# Patient Record
Sex: FEMALE | Race: WHITE
[De-identification: names, ages, dates, MRNs, and addresses within clinical notes are randomized per-mention and may not be internally consistent; named-entity substitution may affect disease eponyms.]

---

## 2020-08-20 PROBLEM — F32.5 MAJOR DEPRESSIVE DISORDER IN REMISSION: Status: ACTIVE | Noted: 2020-08-20

## 2024-11-01 ENCOUNTER — HOSPITAL ENCOUNTER (OUTPATIENT)
Dept: HOSPITAL 100 - LAB | Age: 29
Discharge: HOME | End: 2024-11-01
Payer: COMMERCIAL

## 2024-11-01 DIAGNOSIS — Z79.899: Primary | ICD-10-CM

## 2024-11-01 LAB
ALANINE AMINOTRANSFER ALT/SGPT: 43 U/L (ref 13–56)
ALBUMIN SERPL-MCNC: 4 G/DL (ref 3.2–5)
ALKALINE PHOSPHATASE: 57 U/L (ref 45–117)
AMPHET UR-MCNC: NEGATIVE NG/ML
AST(SGOT): 24 U/L (ref 15–37)
BARBITURATE URINE VISTA: NEGATIVE
BENZODIAZEPINE URINE VISTA: NEGATIVE
BILIRUB DIRECT SERPL-MCNC: 0.11 MG/DL (ref 0–0.3)
COCAINE URINE VISTA: NEGATIVE
DRUG CONFIRMATION TO FOLLOW?: (no result)
ECSTACY URINE VISTA: NEGATIVE
GLOBULIN: 3.2 G/DL (ref 2.2–4.2)
METHADONE URINE VISTA: NEGATIVE
PCP UR QL: NEGATIVE
PH UR: 5 [PH]
THC URINE VISTA: POSITIVE

## 2024-11-01 PROCEDURE — 36415 COLL VENOUS BLD VENIPUNCTURE: CPT

## 2024-11-01 PROCEDURE — 80076 HEPATIC FUNCTION PANEL: CPT

## 2024-11-01 PROCEDURE — 80307 DRUG TEST PRSMV CHEM ANLYZR: CPT

## 2024-11-06 PROBLEM — R42 VERTIGO: Status: ACTIVE | Noted: 2024-11-06

## 2024-11-06 PROBLEM — J02.9 SORE THROAT: Status: ACTIVE | Noted: 2024-11-06

## 2024-11-06 PROBLEM — R59.0 CERVICAL ADENOPATHY: Status: ACTIVE | Noted: 2024-11-06

## 2024-11-27 ENCOUNTER — OFFICE VISIT (OUTPATIENT)
Dept: CARDIOLOGY | Facility: CLINIC | Age: 29
End: 2024-11-27
Payer: MEDICARE

## 2024-11-27 VITALS
HEIGHT: 65 IN | DIASTOLIC BLOOD PRESSURE: 79 MMHG | OXYGEN SATURATION: 98 % | HEART RATE: 90 BPM | SYSTOLIC BLOOD PRESSURE: 138 MMHG | WEIGHT: 278 LBS | BODY MASS INDEX: 46.32 KG/M2

## 2024-11-27 DIAGNOSIS — R00.2 PALPITATIONS: Primary | ICD-10-CM

## 2024-11-27 DIAGNOSIS — R00.0 FAST HEART BEAT: ICD-10-CM

## 2024-11-27 PROCEDURE — 99213 OFFICE O/P EST LOW 20 MIN: CPT | Performed by: PHYSICIAN ASSISTANT

## 2024-11-27 PROCEDURE — 1036F TOBACCO NON-USER: CPT | Performed by: PHYSICIAN ASSISTANT

## 2024-11-27 PROCEDURE — 99203 OFFICE O/P NEW LOW 30 MIN: CPT | Performed by: PHYSICIAN ASSISTANT

## 2024-11-27 PROCEDURE — 3008F BODY MASS INDEX DOCD: CPT | Performed by: PHYSICIAN ASSISTANT

## 2024-11-27 PROCEDURE — 93005 ELECTROCARDIOGRAM TRACING: CPT | Performed by: PHYSICIAN ASSISTANT

## 2024-11-27 RX ORDER — GABAPENTIN 300 MG/1
300 CAPSULE ORAL 2 TIMES DAILY
COMMUNITY

## 2024-11-27 RX ORDER — NEFAZODONE HYDROCHLORIDE 50 MG/1
50 TABLET ORAL DAILY
COMMUNITY

## 2024-11-27 NOTE — PROGRESS NOTES
"Chief Complaint:   Establish Care, palpitations, tachycardia     History Of Present Illness:    Tiffanie Lozoya is a 29 y.o. female presenting with elevated/tachycardia pulse rates with palpitations occurring only with minimal exertion activities.  Heart rates can reach as high as 160 bpm with simply getting out of her car to walk into the office today.  No family history of advanced cardiovascular disease.  Patient denies chest pain, chest pressure, dyspnea on exertion, shortness of breath at rest, diaphoresis, nausea/vomiting, back pain, headache, lightheadedness, dizziness, syncope or presyncopal episodes, active bleeding signs or symptoms, excessive weight gain, muscle or joint pain, claudication.       Last Recorded Vitals:  Vitals:    11/27/24 1044   BP: 138/79   BP Location: Left arm   Patient Position: Sitting   BP Cuff Size: Adult   Pulse: 90   SpO2: 98%   Weight: 126 kg (278 lb)   Height: 1.651 m (5' 5\")       Past Medical History:  She has no past medical history on file.    Past Surgical History:  She has no past surgical history on file.      Social History:  She reports that she has quit smoking. Her smoking use included cigarettes. She has never used smokeless tobacco. No history on file for alcohol use and drug use.    Family History:  No family history on file.     Allergies:  Depakote [divalproex], Adhesive tape-silicones, Amoxicillin, Lamictal [lamotrigine], and Zithromax z-atul [azithromycin]    Outpatient Medications:  Current Outpatient Medications   Medication Instructions    gabapentin (NEURONTIN) 300 mg, oral, 2 times daily    nefazodone (SERZONE) 50 mg, oral, Daily       Physical Exam:  Constitutional: awake and alert, oriented ×3, no apparent distress  Skin: warm, dry, good turgor no obvious lesions  Eyes: pupils equal, round, reactive to light, conjunctiva pink and noninjected, no discharge  HENT: normocephalic and atraumatic, mucous membranes moist, trachea midline with no " "masses/goiter  Cardiovascular: S1/S2 regular, no murmur no rubs/gallops, no carotid bruits, no JVD  Pulmonary: symmetrical chest expansion, lungs are clear to auscultation bilaterally, no wheezes/rales/rhonchi, normal effort  Abdomen: nontender, nondistended, active bowel sounds, no ascites  Extremities: no cyanosis, clubbing, no LE edema no lesions; palpable pedal pulses  Neurologic: cranial nerves II - XII grossly intact, stable gait, no tremor       Last Labs:  CBC -  No results found for: \"WBC\", \"HGB\", \"HCT\", \"MCV\", \"PLT\"    CMP -  No results found for: \"CALCIUM\", \"PHOS\", \"PROT\", \"ALBUMIN\", \"AST\", \"ALT\", \"ALKPHOS\", \"BILITOT\"    LIPID PANEL -   No results found for: \"CHOL\", \"TRIG\", \"HDL\", \"CHHDL\", \"LDLF\", \"VLDL\", \"NHDL\"    RENAL FUNCTION PANEL -   No results found for: \"GLUCOSE\", \"NA\", \"K\", \"CL\", \"CO2\", \"ANIONGAP\", \"BUN\", \"CREATININE\", \"GFRMALE\", \"CALCIUM\", \"PHOS\", \"ALBUMIN\"     Lab Results   Component Value Date    HGBA1C 5.0 06/07/2024       Last Cardiology Tests:  ECG:  No results found for this or any previous visit from the past 1095 days.      Echo:  No results found for this or any previous visit from the past 1095 days.      Ejection Fractions:  No results found for: \"EF\"    Cath:  No results found for this or any previous visit from the past 1095 days.      Stress Test:  No results found for this or any previous visit from the past 1095 days.      Cardiac Imaging:  No results found for this or any previous visit from the past 1095 days.      Assessment/Plan   Problem List Items Addressed This Visit             ICD-10-CM       Cardiac and Vasculature    Fast heart beat R00.0    Relevant Orders    ECG 12 lead (Clinic Performed)    Holter or Event Cardiac Monitor    Palpitations - Primary R00.2    Relevant Orders    Holter or Event Cardiac Monitor       -No concerning findings on today's EKG    -We will place a 48 hour Holter monitor for further arrhythmic evaluation    -F/U 3 months    Rafal Squires, " EVERARDO

## 2024-11-29 LAB
ATRIAL RATE: 85 BPM
P AXIS: 30 DEGREES
P OFFSET: 204 MS
P ONSET: 155 MS
PR INTERVAL: 136 MS
Q ONSET: 223 MS
QRS COUNT: 14 BEATS
QRS DURATION: 92 MS
QT INTERVAL: 368 MS
QTC CALCULATION(BAZETT): 437 MS
QTC FREDERICIA: 413 MS
R AXIS: 83 DEGREES
T AXIS: 27 DEGREES
T OFFSET: 407 MS
VENTRICULAR RATE: 85 BPM

## 2024-12-04 ENCOUNTER — APPOINTMENT (OUTPATIENT)
Dept: CARDIOLOGY | Facility: CLINIC | Age: 29
End: 2024-12-04
Payer: MEDICARE

## 2024-12-04 ENCOUNTER — HOSPITAL ENCOUNTER (OUTPATIENT)
Dept: CARDIOLOGY | Facility: CLINIC | Age: 29
Discharge: HOME | End: 2024-12-04
Payer: MEDICARE

## 2024-12-04 DIAGNOSIS — R00.2 PALPITATIONS: ICD-10-CM

## 2024-12-04 DIAGNOSIS — R00.0 FAST HEART BEAT: ICD-10-CM

## 2024-12-04 PROCEDURE — 93225 XTRNL ECG REC<48 HRS REC: CPT

## 2024-12-11 ENCOUNTER — APPOINTMENT (OUTPATIENT)
Dept: PRIMARY CARE | Facility: CLINIC | Age: 29
End: 2024-12-11

## 2024-12-11 VITALS
DIASTOLIC BLOOD PRESSURE: 70 MMHG | HEIGHT: 65 IN | OXYGEN SATURATION: 97 % | HEART RATE: 110 BPM | BODY MASS INDEX: 46.08 KG/M2 | SYSTOLIC BLOOD PRESSURE: 120 MMHG | WEIGHT: 276.6 LBS

## 2024-12-11 DIAGNOSIS — Z00.00 ANNUAL PHYSICAL EXAM: Primary | ICD-10-CM

## 2024-12-11 DIAGNOSIS — R76.8 ANA POSITIVE: ICD-10-CM

## 2024-12-11 DIAGNOSIS — F33.2 SEVERE RECURRENT MAJOR DEPRESSION WITHOUT PSYCHOTIC FEATURES (MULTI): ICD-10-CM

## 2024-12-11 PROBLEM — R42 VERTIGO: Status: RESOLVED | Noted: 2024-11-06 | Resolved: 2024-12-11

## 2024-12-11 PROBLEM — H47.329 OPTIC NERVE DRUSEN: Status: ACTIVE | Noted: 2024-12-11

## 2024-12-11 PROBLEM — G43.109 OCULAR MIGRAINE: Status: ACTIVE | Noted: 2024-12-11

## 2024-12-11 PROBLEM — J02.9 SORE THROAT: Status: RESOLVED | Noted: 2024-11-06 | Resolved: 2024-12-11

## 2024-12-11 PROBLEM — F41.9 ANXIETY: Status: ACTIVE | Noted: 2024-12-11

## 2024-12-11 PROBLEM — F31.81 BIPOLAR 2 DISORDER (MULTI): Status: RESOLVED | Noted: 2024-06-08 | Resolved: 2024-12-11

## 2024-12-11 PROBLEM — F31.81 BIPOLAR 2 DISORDER (MULTI): Status: ACTIVE | Noted: 2024-06-08

## 2024-12-11 PROBLEM — F32.5 MAJOR DEPRESSIVE DISORDER IN REMISSION (CMS-HCC): Status: ACTIVE | Noted: 2020-08-20

## 2024-12-11 PROBLEM — F32.A DEPRESSION: Status: ACTIVE | Noted: 2024-12-11

## 2024-12-11 PROBLEM — F90.9 ADHD (ATTENTION DEFICIT HYPERACTIVITY DISORDER): Status: ACTIVE | Noted: 2024-12-11

## 2024-12-11 PROBLEM — R59.0 CERVICAL ADENOPATHY: Status: RESOLVED | Noted: 2024-11-06 | Resolved: 2024-12-11

## 2024-12-11 PROBLEM — F40.10 SOCIAL ANXIETY DISORDER: Status: ACTIVE | Noted: 2024-12-11

## 2024-12-11 PROCEDURE — 1036F TOBACCO NON-USER: CPT | Performed by: INTERNAL MEDICINE

## 2024-12-11 PROCEDURE — 3008F BODY MASS INDEX DOCD: CPT | Performed by: INTERNAL MEDICINE

## 2024-12-11 PROCEDURE — 99385 PREV VISIT NEW AGE 18-39: CPT | Performed by: INTERNAL MEDICINE

## 2024-12-11 RX ORDER — CLOBETASOL PROPIONATE 0.5 MG/G
1 CREAM TOPICAL 2 TIMES DAILY
COMMUNITY

## 2024-12-11 RX ORDER — NEFAZODONE HYDROCHLORIDE 100 MG/1
100 TABLET ORAL 2 TIMES DAILY
COMMUNITY
Start: 2024-11-25

## 2024-12-11 RX ORDER — DESONIDE 0.5 MG/G
1 CREAM TOPICAL 2 TIMES DAILY
COMMUNITY

## 2024-12-11 RX ORDER — CLOBETASOL PROPIONATE 0.5 MG/ML
1 SOLUTION TOPICAL 2 TIMES DAILY
COMMUNITY

## 2024-12-11 ASSESSMENT — PATIENT HEALTH QUESTIONNAIRE - PHQ9
5. POOR APPETITE OR OVEREATING: NEARLY EVERY DAY
9. THOUGHTS THAT YOU WOULD BE BETTER OFF DEAD, OR OF HURTING YOURSELF: NEARLY EVERY DAY
8. MOVING OR SPEAKING SO SLOWLY THAT OTHER PEOPLE COULD HAVE NOTICED. OR THE OPPOSITE, BEING SO FIGETY OR RESTLESS THAT YOU HAVE BEEN MOVING AROUND A LOT MORE THAN USUAL: NOT AT ALL
6. FEELING BAD ABOUT YOURSELF - OR THAT YOU ARE A FAILURE OR HAVE LET YOURSELF OR YOUR FAMILY DOWN: NEARLY EVERY DAY
1. LITTLE INTEREST OR PLEASURE IN DOING THINGS: NEARLY EVERY DAY
3. TROUBLE FALLING OR STAYING ASLEEP OR SLEEPING TOO MUCH: NEARLY EVERY DAY
SUM OF ALL RESPONSES TO PHQ QUESTIONS 1-9: 24
7. TROUBLE CONCENTRATING ON THINGS, SUCH AS READING THE NEWSPAPER OR WATCHING TELEVISION: NEARLY EVERY DAY
10. IF YOU CHECKED OFF ANY PROBLEMS, HOW DIFFICULT HAVE THESE PROBLEMS MADE IT FOR YOU TO DO YOUR WORK, TAKE CARE OF THINGS AT HOME, OR GET ALONG WITH OTHER PEOPLE: EXTREMELY DIFFICULT
2. FEELING DOWN, DEPRESSED OR HOPELESS: NEARLY EVERY DAY
SUM OF ALL RESPONSES TO PHQ9 QUESTIONS 1 AND 2: 6
4. FEELING TIRED OR HAVING LITTLE ENERGY: NEARLY EVERY DAY

## 2024-12-11 ASSESSMENT — PAIN SCALES - GENERAL: PAINLEVEL_OUTOF10: 4

## 2024-12-11 ASSESSMENT — ENCOUNTER SYMPTOMS
SORE THROAT: 0
NERVOUS/ANXIOUS: 1
SLEEP DISTURBANCE: 0
PALPITATIONS: 0
ABDOMINAL PAIN: 0
COUGH: 0
DYSPHORIC MOOD: 1
VOMITING: 0
TROUBLE SWALLOWING: 0
ARTHRALGIAS: 0
FREQUENCY: 0
FATIGUE: 0
SHORTNESS OF BREATH: 0
LIGHT-HEADEDNESS: 0
CONSTIPATION: 0
DECREASED CONCENTRATION: 1
DYSURIA: 0
FEVER: 0
DIARRHEA: 0
NAUSEA: 0
DIZZINESS: 0

## 2024-12-11 NOTE — ASSESSMENT & PLAN NOTE
Annual physical completed today.  We will be checking baseline labs.  Patient has a remote history in 2019 of a possible DIANA positive test but she is never followed up on it.  She has multiple aches and pains in her review of system is very vague and is very hard to get her narrow down to say yes or no to almost any question.  We will repeat an DIANA with normal blood work to make sure that nothing else needs to be evaluated  Although she complains of multiple aches and pains that seem to move around she does not have any evidence of synovitis and denies swell swollen red joints in any way shape or form.

## 2024-12-11 NOTE — ASSESSMENT & PLAN NOTE
Regarding all the patient's psychiatric issues this is beyond the scope of my normal practice and I did explain that to her.  I will be happy to manage all of her other medical issues which currently do not seem to be severe.  She was offered a  referral to psychiatry which she declines today but she can call back and we will be happy to make that referral in the future if she would like it.  In the interim in the meantime I did encourage her to stay as an active patient with [facility] as it appears her psychiatric illness is quite severe.    I did note that throughout the visit today she did not make any eye contact with me was fidgeting with her shirt and seems somewhat anxious and uncomfortable throughout the interview process.    Although patient admits to suicidal thoughts she lives with her mother brother and her dog and has no specific plan and promises not to harm herself at this time.

## 2024-12-11 NOTE — PROGRESS NOTES
Subjective   Patient ID: Tiffanie Lozoya is a 29 y.o. female who presents for new patient visit.    Patient is here to establish with a new primary care physician.  She is currently a patient [facility] for multiple psychiatric issues including possible bipolar disorder depression anxiety.  Patient admits she has suicidal thoughts but no specific plan.  She is seeking a new counselor as well and I offered her referral to our  and a possible referral to psych at  which she declines today but will consider and call back if she changes her mind.  She is not exactly happy with alternative plan as but she also does not want to travel downtown [place] for psychiatric care.        Review of Systems   Constitutional:  Negative for fatigue and fever.   HENT:  Negative for sore throat and trouble swallowing.    Eyes:  Negative for visual disturbance.   Respiratory:  Negative for cough and shortness of breath.    Cardiovascular:  Negative for chest pain, palpitations and leg swelling.   Gastrointestinal:  Negative for abdominal pain, constipation, diarrhea, nausea and vomiting.   Genitourinary:  Negative for dysuria and frequency.   Musculoskeletal:  Negative for arthralgias.   Skin:  Negative for rash.   Neurological:  Negative for dizziness and light-headedness.   Psychiatric/Behavioral:  Positive for behavioral problems, decreased concentration, dysphoric mood and suicidal ideas. Negative for self-injury and sleep disturbance. The patient is nervous/anxious.        Objective   Medication Documentation Review Audit       Reviewed by Prachi Wells MD (Physician) on 12/11/24 at 1046      Medication Order Taking? Sig Documenting Provider Last Dose Status   clobetasol (Temovate) 0.05 % cream [id number]  Apply 1 Application topically 2 times a day. Historical Provider, MD  Active   clobetasol (Temovate) 0.05 % external solution [id number]  Apply 1 Application topically 2 times a day. Historical  "Provider, MD  Active   desonide (DesOwen) 0.05 % cream [id number]  Apply 1 Application topically 2 times a day. Historical Provider, MD  Active   gabapentin (Neurontin) 300 mg capsule [id number]  Take 1 capsule (300 mg) by mouth 2 times a day. Historical Provider, MD  Active   nefazodone (Serzone) 100 mg tablet [id number] Yes Take 1 tablet (100 mg) by mouth 2 times a day. Historical Provider, MD  Active     Discontinued 12/11/24 1036                  Allergies   Allergen Reactions    Depakote [Divalproex] Hives    Adhesive Tape-Silicones Rash    Amoxicillin Rash    Lamictal [Lamotrigine] Rash    Zithromax Z-Wayne [Azithromycin] Rash       /70   Pulse 110   Ht 1.651 m (5' 5\")   Wt 125 kg (276 lb 9.6 oz) Comment: per pt 65 in  SpO2 97%   BMI 46.03 kg/m²     Physical Exam  Constitutional:       Appearance: Normal appearance. She is obese.   HENT:      Head: Normocephalic and atraumatic.      Nose: Nose normal.   Eyes:      Extraocular Movements: Extraocular movements intact.      Pupils: Pupils are equal, round, and reactive to light.   Cardiovascular:      Rate and Rhythm: Normal rate and regular rhythm.   Pulmonary:      Breath sounds: Normal breath sounds.   Abdominal:      General: Abdomen is flat. Bowel sounds are normal.      Palpations: Abdomen is soft.   Musculoskeletal:      Right lower leg: No edema.      Left lower leg: No edema.   Neurological:      Mental Status: She is alert.           Assessment/Plan   Problem List Items Addressed This Visit       DIANA positive    Relevant Orders    Lipid Panel    CBC    Comprehensive Metabolic Panel    TSH with reflex to Free T4 if abnormal    Vitamin D 25-Hydroxy,Total (for eval of Vitamin D levels)    DIANA with Reflex to ZARINA    Severe recurrent major depression without psychotic features (Multi)     Regarding all the patient's psychiatric issues this is beyond the scope of my normal practice and I did explain that to her.  I will be happy to manage all of her " other medical issues which currently do not seem to be severe.  She was offered a  referral to psychiatry which she declines today but she can call back and we will be happy to make that referral in the future if she would like it.  In the interim in the meantime I did encourage her to stay as an active patient with [facility] as it appears her psychiatric illness is quite severe.    I did note that throughout the visit today she did not make any eye contact with me was fidgeting with her shirt and seems somewhat anxious and uncomfortable throughout the interview process.    Although patient admits to suicidal thoughts she lives with her mother brother and her dog and has no specific plan and promises not to harm herself at this time.         Relevant Orders    Lipid Panel    CBC    Comprehensive Metabolic Panel    TSH with reflex to Free T4 if abnormal    Vitamin D 25-Hydroxy,Total (for eval of Vitamin D levels)    DIANA with Reflex to ZARINA    Annual physical exam - Primary     Annual physical completed today.  We will be checking baseline labs.  Patient has a remote history in 2019 of a possible DIANA positive test but she is never followed up on it.  She has multiple aches and pains in her review of system is very vague and is very hard to get her narrow down to say yes or no to almost any question.  We will repeat an DIANA with normal blood work to make sure that nothing else needs to be evaluated  Although she complains of multiple aches and pains that seem to move around she does not have any evidence of synovitis and denies swell swollen red joints in any way shape or form.           Relevant Orders    Lipid Panel    CBC    Comprehensive Metabolic Panel    TSH with reflex to Free T4 if abnormal    Vitamin D 25-Hydroxy,Total (for eval of Vitamin D levels)    DIANA with Reflex to ZARINA              It has been a pleasure seeing you.  Prachi Wells MD

## 2024-12-17 ENCOUNTER — APPOINTMENT (OUTPATIENT)
Dept: PRIMARY CARE | Facility: CLINIC | Age: 29
End: 2024-12-17
Payer: MEDICARE

## 2024-12-18 ENCOUNTER — APPOINTMENT (OUTPATIENT)
Dept: PRIMARY CARE | Facility: CLINIC | Age: 29
End: 2024-12-18
Payer: MEDICARE

## 2024-12-18 ENCOUNTER — LAB (OUTPATIENT)
Dept: LAB | Facility: LAB | Age: 29
End: 2024-12-18
Payer: MEDICARE

## 2024-12-18 VITALS
OXYGEN SATURATION: 95 % | BODY MASS INDEX: 46.88 KG/M2 | SYSTOLIC BLOOD PRESSURE: 121 MMHG | DIASTOLIC BLOOD PRESSURE: 73 MMHG | HEART RATE: 91 BPM | HEIGHT: 65 IN | WEIGHT: 281.4 LBS

## 2024-12-18 DIAGNOSIS — R22.0 SUBCUTANEOUS MASS OF HEAD: Primary | ICD-10-CM

## 2024-12-18 DIAGNOSIS — Z00.00 ANNUAL PHYSICAL EXAM: ICD-10-CM

## 2024-12-18 DIAGNOSIS — R76.8 ANA POSITIVE: ICD-10-CM

## 2024-12-18 DIAGNOSIS — F33.2 SEVERE RECURRENT MAJOR DEPRESSION WITHOUT PSYCHOTIC FEATURES (MULTI): ICD-10-CM

## 2024-12-18 PROCEDURE — 82306 VITAMIN D 25 HYDROXY: CPT

## 2024-12-18 PROCEDURE — 80053 COMPREHEN METABOLIC PANEL: CPT

## 2024-12-18 PROCEDURE — 99213 OFFICE O/P EST LOW 20 MIN: CPT | Performed by: INTERNAL MEDICINE

## 2024-12-18 PROCEDURE — 1036F TOBACCO NON-USER: CPT | Performed by: INTERNAL MEDICINE

## 2024-12-18 PROCEDURE — 3008F BODY MASS INDEX DOCD: CPT | Performed by: INTERNAL MEDICINE

## 2024-12-18 PROCEDURE — 86038 ANTINUCLEAR ANTIBODIES: CPT

## 2024-12-18 PROCEDURE — 84443 ASSAY THYROID STIM HORMONE: CPT

## 2024-12-18 PROCEDURE — 85027 COMPLETE CBC AUTOMATED: CPT

## 2024-12-18 PROCEDURE — 36415 COLL VENOUS BLD VENIPUNCTURE: CPT

## 2024-12-18 PROCEDURE — 80061 LIPID PANEL: CPT

## 2024-12-18 ASSESSMENT — ENCOUNTER SYMPTOMS
CONSTIPATION: 0
ABDOMINAL PAIN: 0
SORE THROAT: 0
NAUSEA: 0
FREQUENCY: 0
FATIGUE: 0
FEVER: 0
ARTHRALGIAS: 0
VOMITING: 0
DIARRHEA: 0
DIZZINESS: 0
PALPITATIONS: 0
SHORTNESS OF BREATH: 0
COUGH: 0
TROUBLE SWALLOWING: 0
DYSURIA: 0
LIGHT-HEADEDNESS: 0

## 2024-12-18 ASSESSMENT — PAIN SCALES - GENERAL: PAINLEVEL_OUTOF10: 4

## 2024-12-18 NOTE — ASSESSMENT & PLAN NOTE
Patient is feeling a subcutaneous mass on the head on the left occiput near the mandibular ridge.  I do not feel the same as she is describing and only feeling 1 dimension which is why I think this is a ridge of natural tissue and not a true mass at all.  It is not firm I cannot grasp it between 2 fingers so it is not a cyst, not acne and not a lymph node.  I attempted to reassure the patient that this was a benign process and that we would monitor it for now to make sure it is not change or grow.

## 2024-12-18 NOTE — PROGRESS NOTES
"Subjective   Patient ID: Tiffanie Lozoya is a 29 y.o. female who presents for a lump in the back of the neck.    Patient is here today for a bump she found on the back of her neck and her head.  She discovered it approximately a week ago while rubbing her head she found an area that she is calling a \"lump\".  It does not bother her when she washes her hair it is not burning it is not draining but she can feel it and has noticed that it is slightly uncomfortable.  During the examination today and while talking to the patient it was quite obvious that she is touching the area over and over again and may just be sensitizing it herself because of the repeated checks she is doing            Review of Systems   Constitutional:  Negative for fatigue and fever.        Lump or bump was found on the patient's head as a coincidence, there is no erythema drainage or other symptoms.  It was a coincidental.   HENT:  Negative for sore throat and trouble swallowing.    Eyes:  Negative for visual disturbance.   Respiratory:  Negative for cough and shortness of breath.    Cardiovascular:  Negative for chest pain, palpitations and leg swelling.   Gastrointestinal:  Negative for abdominal pain, constipation, diarrhea, nausea and vomiting.   Genitourinary:  Negative for dysuria and frequency.   Musculoskeletal:  Negative for arthralgias.   Skin:  Negative for rash.   Neurological:  Negative for dizziness and light-headedness.       Objective   Medication Documentation Review Audit       Reviewed by Prachi Wells MD (Physician) on 12/11/24 at 1046      Medication Order Taking? Sig Documenting Provider Last Dose Status   clobetasol (Temovate) 0.05 % cream [id number]  Apply 1 Application topically 2 times a day. Historical Provider, MD  Active   clobetasol (Temovate) 0.05 % external solution [id number]  Apply 1 Application topically 2 times a day. Historical Provider, MD  Active   desonide (DesOwen) 0.05 % cream [id number]  Apply 1 " "Application topically 2 times a day. Historical Provider, MD  Active   gabapentin (Neurontin) 300 mg capsule [id number]  Take 1 capsule (300 mg) by mouth 2 times a day. Historical Provider, MD  Active   nefazodone (Serzone) 100 mg tablet [id number] Yes Take 1 tablet (100 mg) by mouth 2 times a day. Historical Provider, MD  Active     Discontinued 12/11/24 1036                  Allergies   Allergen Reactions    Depakote [Divalproex] Hives    Adhesive Tape-Silicones Rash    Amoxicillin Rash    Lamictal [Lamotrigine] Rash    Zithromax Z-Wayne [Azithromycin] Rash       /73   Pulse 91   Ht 1.651 m (5' 5\")   Wt 128 kg (281 lb 6.4 oz)   SpO2 95%   BMI 46.83 kg/m²     Physical Exam  Constitutional:       Appearance: Normal appearance.   HENT:      Head: Normocephalic and atraumatic.      Comments: Near the occiput versus the mastoid process on the left side.  I asked the patient to point touch the lump when I compare the left to the right side I barely feel an area that is slightly more pronounced but I would call it a ridge of tissue and not a true lump.  I could palpate the slight ridge which is less than 4 mm across in 1 direction only.  When I reverse the direction or came from 90 degree angles I could not palpate the area.  It is clearly not acne, not a cyst, not a lipoma and not a lymph node.  I believe this is a normal ridge of tissue and have tried to reassure the patient it is benign.  The patient is insistent that this is some sort of mass.  I have told her to keep an eye on it and examine it twice a day but no more than that so she does not irritate the area.  If it is still present or growing or changing she should come back and see me otherwise we will monitor the area but again I tried to reassure the patient it was benign     Nose: Nose normal.   Eyes:      Extraocular Movements: Extraocular movements intact.      Pupils: Pupils are equal, round, and reactive to light.   Cardiovascular:      Rate and " Rhythm: Normal rate and regular rhythm.   Pulmonary:      Breath sounds: Normal breath sounds.   Abdominal:      General: Abdomen is flat. Bowel sounds are normal.      Palpations: Abdomen is soft.   Musculoskeletal:      Right lower leg: No edema.      Left lower leg: No edema.   Neurological:      Mental Status: She is alert.           Assessment/Plan   Problem List Items Addressed This Visit       Subcutaneous mass of head - Primary     Patient is feeling a subcutaneous mass on the head on the left occiput near the mandibular ridge.  I do not feel the same as she is describing and only feeling 1 dimension which is why I think this is a ridge of natural tissue and not a true mass at all.  It is not firm I cannot grasp it between 2 fingers so it is not a cyst, not acne and not a lymph node.  I attempted to reassure the patient that this was a benign process and that we would monitor it for now to make sure it is not change or grow.        Patient requested an updated review of systems on January 16, 2025 to this note.  The chart correction she requested has been copy and pasted in the patient's own words.  This addendum was added by Dr. Caty zheng on January 16, 2025 at 9:52 AM  Chart correction requested:   If arthralgias are joint pain, is present/comes & goes.Elbows,wrists,hands,kn ees&ankles get sharp, achey twinges of pain. Also feel fatigue daily. Have shortness of breath,chest pain,palpitations when heart rate spikes,120-150.From anxiety and moving more than usual.Wore 48 hour heart monitor 4th-6th. 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 Did follow up on 2019 DIANA results with  [facility].Don't remember what was done but nothing found. History of self harm.Last time July but on mind a lot, not in a good menta l state. Realized I have sleep disturbances.Stay up until 12ish, almost always wake up at 3 to pee, have a hard time falling back asleep. Up around 6. Daily naps.             It has been a pleasure seeing you.  Prachi Wells MD

## 2024-12-19 ENCOUNTER — TELEPHONE (OUTPATIENT)
Dept: PRIMARY CARE | Facility: CLINIC | Age: 29
End: 2024-12-19
Payer: MEDICARE

## 2024-12-19 LAB
25(OH)D3 SERPL-MCNC: 20 NG/ML (ref 30–100)
ALBUMIN SERPL BCP-MCNC: 4.4 G/DL (ref 3.4–5)
ALP SERPL-CCNC: 52 U/L (ref 33–110)
ALT SERPL W P-5'-P-CCNC: 34 U/L (ref 7–45)
ANA SER QL HEP2 SUBST: NEGATIVE
ANION GAP SERPL CALC-SCNC: 17 MMOL/L (ref 10–20)
AST SERPL W P-5'-P-CCNC: 23 U/L (ref 9–39)
BILIRUB SERPL-MCNC: 0.5 MG/DL (ref 0–1.2)
BUN SERPL-MCNC: 11 MG/DL (ref 6–23)
CALCIUM SERPL-MCNC: 9.3 MG/DL (ref 8.6–10.6)
CHLORIDE SERPL-SCNC: 104 MMOL/L (ref 98–107)
CHOLEST SERPL-MCNC: 229 MG/DL (ref 0–199)
CHOLESTEROL/HDL RATIO: 5.6
CO2 SERPL-SCNC: 22 MMOL/L (ref 21–32)
CREAT SERPL-MCNC: 0.74 MG/DL (ref 0.5–1.05)
EGFRCR SERPLBLD CKD-EPI 2021: >90 ML/MIN/1.73M*2
ERYTHROCYTE [DISTWIDTH] IN BLOOD BY AUTOMATED COUNT: 12.6 % (ref 11.5–14.5)
GLUCOSE SERPL-MCNC: 88 MG/DL (ref 74–99)
HCT VFR BLD AUTO: 40.3 % (ref 36–46)
HDLC SERPL-MCNC: 40.8 MG/DL
HGB BLD-MCNC: 13.8 G/DL (ref 12–16)
LDLC SERPL CALC-MCNC: 149 MG/DL
MCH RBC QN AUTO: 29.2 PG (ref 26–34)
MCHC RBC AUTO-ENTMCNC: 34.2 G/DL (ref 32–36)
MCV RBC AUTO: 85 FL (ref 80–100)
NON HDL CHOLESTEROL: 188 MG/DL (ref 0–149)
NRBC BLD-RTO: 0 /100 WBCS (ref 0–0)
PLATELET # BLD AUTO: 349 X10*3/UL (ref 150–450)
POTASSIUM SERPL-SCNC: 4 MMOL/L (ref 3.5–5.3)
PROT SERPL-MCNC: 7.1 G/DL (ref 6.4–8.2)
RBC # BLD AUTO: 4.73 X10*6/UL (ref 4–5.2)
SODIUM SERPL-SCNC: 139 MMOL/L (ref 136–145)
TRIGL SERPL-MCNC: 197 MG/DL (ref 0–149)
TSH SERPL-ACNC: 1.98 MIU/L (ref 0.44–3.98)
VLDL: 39 MG/DL (ref 0–40)
WBC # BLD AUTO: 8.7 X10*3/UL (ref 4.4–11.3)

## 2024-12-19 NOTE — TELEPHONE ENCOUNTER
----- Message from Prachi Wells sent at 12/19/2024  2:45 PM EST -----  Please notify patient her DIANA or marker for autoimmune diseases was negative or normal.

## 2024-12-19 NOTE — TELEPHONE ENCOUNTER
Defib pad site: anterior/posterior  Defib pad site assessment: skin integrity intact  Patient was notified by leaving message on an identified voicemail.

## 2024-12-20 ENCOUNTER — TELEPHONE (OUTPATIENT)
Dept: PRIMARY CARE | Facility: CLINIC | Age: 29
End: 2024-12-20

## 2024-12-20 NOTE — TELEPHONE ENCOUNTER
----- Message from Prachi Wells sent at 12/19/2024  7:50 AM EST -----  Please notify patient her complete blood count, chemistry profile and thyroid levels were all normal.  Her cholesterol shows an elevation in several areas so she needs to watch her diet and work on a low-carb and low-fat diet and we will repeated in a year  Patient's vitamin D is also very low at only 20.  She should start taking vitamin D over-the-counter 5000 international units which equals iron 25 mcg every day.  Please take this every day for the next year as it is very slow to get vitamin D levels back into the normal range.  We will check it again in 1 year

## 2025-02-10 ENCOUNTER — OFFICE VISIT (OUTPATIENT)
Dept: CARDIOLOGY | Facility: CLINIC | Age: 30
End: 2025-02-10
Payer: MEDICARE

## 2025-02-10 VITALS
SYSTOLIC BLOOD PRESSURE: 122 MMHG | BODY MASS INDEX: 44.82 KG/M2 | DIASTOLIC BLOOD PRESSURE: 79 MMHG | OXYGEN SATURATION: 98 % | WEIGHT: 269 LBS | HEIGHT: 65 IN | HEART RATE: 94 BPM

## 2025-02-10 DIAGNOSIS — F33.2 SEVERE RECURRENT MAJOR DEPRESSION WITHOUT PSYCHOTIC FEATURES (MULTI): ICD-10-CM

## 2025-02-10 DIAGNOSIS — R00.0 FAST HEART BEAT: ICD-10-CM

## 2025-02-10 DIAGNOSIS — F41.9 ANXIETY: ICD-10-CM

## 2025-02-10 DIAGNOSIS — R00.2 PALPITATIONS: Primary | ICD-10-CM

## 2025-02-10 PROBLEM — F32.A DEPRESSION: Status: RESOLVED | Noted: 2024-12-11 | Resolved: 2025-02-10

## 2025-02-10 PROBLEM — E66.01 MORBID OBESITY (MULTI): Status: ACTIVE | Noted: 2025-02-10

## 2025-02-10 PROBLEM — F32.5 MAJOR DEPRESSIVE DISORDER IN REMISSION (CMS-HCC): Status: RESOLVED | Noted: 2020-08-20 | Resolved: 2025-02-10

## 2025-02-10 PROCEDURE — 99214 OFFICE O/P EST MOD 30 MIN: CPT | Performed by: PHYSICIAN ASSISTANT

## 2025-02-10 PROCEDURE — 1036F TOBACCO NON-USER: CPT | Performed by: PHYSICIAN ASSISTANT

## 2025-02-10 PROCEDURE — 3008F BODY MASS INDEX DOCD: CPT | Performed by: PHYSICIAN ASSISTANT

## 2025-02-10 RX ORDER — VIT C/E/ZN/COPPR/LUTEIN/ZEAXAN 250MG-90MG
25 CAPSULE ORAL DAILY
COMMUNITY

## 2025-02-10 NOTE — PROGRESS NOTES
"Chief Complaint:   Follow-up, palpitations, tachycardia     History Of Present Illness:    02/10/25  Patient completed a 48 hour Holter displaying sinus rhythm throughout without sustained arrhythmias.  No worsening of symptoms of palpitations, however patient was recently hospitalized due to SI.    11/27/24  Tiffanie Lozoya is a 29 y.o. female presenting with elevated/tachycardia pulse rates with palpitations occurring only with minimal exertion activities.  Heart rates can reach as high as 160 bpm with simply getting out of her car to walk into the office today.  No family history of advanced cardiovascular disease.  Patient denies chest pain, chest pressure, dyspnea on exertion, shortness of breath at rest, diaphoresis, nausea/vomiting, back pain, headache, lightheadedness, dizziness, syncope or presyncopal episodes, active bleeding signs or symptoms, excessive weight gain, muscle or joint pain, claudication.       Last Recorded Vitals:  Vitals:    02/10/25 1122   BP: 122/79   BP Location: Left arm   Patient Position: Sitting   Pulse: 94   SpO2: 98%   Weight: 122 kg (269 lb)   Height: 1.651 m (5' 5\")       Past Medical History:  She has no past medical history on file.    Past Surgical History:  She has a past surgical history that includes Tonsillectomy; Adenoidectomy; Tubal ligation; and [place] tooth extraction.      Social History:  She reports that she quit smoking about 10 years ago. Her smoking use included cigarettes. She started smoking about 6 years ago. She has a 6.1 pack-year smoking history. She has never been exposed to tobacco smoke. She has never used smokeless tobacco. She reports that she does not currently use alcohol. She reports current drug use. Drug: Marijuana.    Family History:  Family History   Problem Relation Name Age of Onset    Osteoarthritis Mother      No Known Problems Father      Hypothyroidism Sister      No Known Problems Brother      Bilateral breast cancer Paternal " Grandmother      Ovarian cancer Paternal Grandmother          Allergies:  Depakote [divalproex], Adhesive tape-silicones, Amoxicillin, Lamictal [lamotrigine], and Zithromax z-atul [azithromycin]    Outpatient Medications:  Current Outpatient Medications   Medication Instructions    cholecalciferol (VITAMIN D-3) 25 mcg, Daily    clobetasol (Temovate) 0.05 % cream 1 Application, 2 times daily    clobetasol (Temovate) 0.05 % external solution 1 Application, 2 times daily    desonide (DesOwen) 0.05 % cream 1 Application, 2 times daily    gabapentin (NEURONTIN) 300 mg, 2 times daily    nefazodone (SERZONE) 100 mg, 2 times daily       Physical Exam:  Constitutional: awake and alert, oriented ×3, no apparent distress  Skin: warm, dry, good turgor no obvious lesions  Eyes: pupils equal, round, reactive to light, conjunctiva pink and noninjected, no discharge  HENT: normocephalic and atraumatic, mucous membranes moist, trachea midline with no masses/goiter  Cardiovascular: S1/S2 regular, no murmur no rubs/gallops, no carotid bruits, no JVD  Pulmonary: symmetrical chest expansion, lungs are clear to auscultation bilaterally, no wheezes/rales/rhonchi, normal effort  Abdomen: nontender, nondistended, active bowel sounds, no ascites  Extremities: no cyanosis, clubbing, no LE edema no lesions; palpable pedal pulses  Neurologic: cranial nerves II - XII grossly intact, stable gait, no tremor       Last Labs:  CBC -  Lab Results   Component Value Date    WBC 8.7 12/18/2024    HGB 13.8 12/18/2024    HCT 40.3 12/18/2024    MCV 85 12/18/2024     12/18/2024       CMP -  Lab Results   Component Value Date    CALCIUM 9.3 12/18/2024    PROT 7.1 12/18/2024    ALBUMIN 4.4 12/18/2024    AST 23 12/18/2024    ALT 34 12/18/2024    ALKPHOS 52 12/18/2024    BILITOT 0.5 12/18/2024       LIPID PANEL -   Lab Results   Component Value Date    CHOL 229 (H) 12/18/2024    TRIG 197 (H) 12/18/2024    HDL 40.8 12/18/2024    CHHDL 5.6 12/18/2024     "VLDL 39 12/18/2024    NHDL 188 (H) 12/18/2024       RENAL FUNCTION PANEL -   Lab Results   Component Value Date    GLUCOSE 88 12/18/2024     12/18/2024    K 4.0 12/18/2024     12/18/2024    CO2 22 12/18/2024    ANIONGAP 17 12/18/2024    BUN 11 12/18/2024    CREATININE 0.74 12/18/2024    CALCIUM 9.3 12/18/2024    ALBUMIN 4.4 12/18/2024        Lab Results   Component Value Date    HGBA1C 5.0 06/07/2024       Last Cardiology Tests:  ECG:  No results found for this or any previous visit from the past 1095 days.      Echo:  No results found for this or any previous visit from the past 1095 days.      Ejection Fractions:  No results found for: \"EF\"    Cath:  No results found for this or any previous visit from the past 1095 days.      Stress Test:  No results found for this or any previous visit from the past 1095 days.      Cardiac Imaging:  No results found for this or any previous visit from the past 1095 days.      Assessment/Plan   Problem List Items Addressed This Visit             ICD-10-CM       Cardiac and Vasculature    Fast heart beat R00.0    Palpitations - Primary R00.2       Mental Health    Anxiety F41.9    Severe recurrent major depression without psychotic features (Multi) F33.2         -F/U on an as needed basis moving forward    Rafal Squires PA-C  "

## 2025-02-11 ENCOUNTER — APPOINTMENT (OUTPATIENT)
Dept: PRIMARY CARE | Facility: CLINIC | Age: 30
End: 2025-02-11
Payer: MEDICARE

## 2025-03-18 ENCOUNTER — APPOINTMENT (OUTPATIENT)
Dept: PRIMARY CARE | Facility: CLINIC | Age: 30
End: 2025-03-18
Payer: MEDICARE

## 2025-03-25 ENCOUNTER — HOSPITAL ENCOUNTER (OUTPATIENT)
Dept: RADIOLOGY | Facility: CLINIC | Age: 30
Discharge: HOME | End: 2025-03-25
Payer: MEDICARE

## 2025-03-25 ENCOUNTER — OFFICE VISIT (OUTPATIENT)
Dept: PRIMARY CARE | Facility: CLINIC | Age: 30
End: 2025-03-25
Payer: MEDICARE

## 2025-03-25 ENCOUNTER — APPOINTMENT (OUTPATIENT)
Dept: PRIMARY CARE | Facility: CLINIC | Age: 30
End: 2025-03-25
Payer: MEDICARE

## 2025-03-25 VITALS
SYSTOLIC BLOOD PRESSURE: 117 MMHG | WEIGHT: 261.8 LBS | DIASTOLIC BLOOD PRESSURE: 81 MMHG | OXYGEN SATURATION: 96 % | BODY MASS INDEX: 43.62 KG/M2 | HEART RATE: 79 BPM | HEIGHT: 65 IN

## 2025-03-25 DIAGNOSIS — R25.2 MUSCLE CRAMPS: ICD-10-CM

## 2025-03-25 DIAGNOSIS — M54.50 CHRONIC MIDLINE LOW BACK PAIN WITHOUT SCIATICA: Primary | ICD-10-CM

## 2025-03-25 DIAGNOSIS — G89.29 CHRONIC MIDLINE LOW BACK PAIN WITHOUT SCIATICA: ICD-10-CM

## 2025-03-25 DIAGNOSIS — M54.50 CHRONIC MIDLINE LOW BACK PAIN WITHOUT SCIATICA: ICD-10-CM

## 2025-03-25 DIAGNOSIS — R25.2 HAND CRAMPS: ICD-10-CM

## 2025-03-25 DIAGNOSIS — G89.29 CHRONIC MIDLINE LOW BACK PAIN WITHOUT SCIATICA: Primary | ICD-10-CM

## 2025-03-25 LAB — CK SERPL-CCNC: 158 U/L (ref 20–239)

## 2025-03-25 PROCEDURE — 1036F TOBACCO NON-USER: CPT | Performed by: INTERNAL MEDICINE

## 2025-03-25 PROCEDURE — 99214 OFFICE O/P EST MOD 30 MIN: CPT | Performed by: INTERNAL MEDICINE

## 2025-03-25 PROCEDURE — G8433 SCR FOR DEP NOT CPT DOC RSN: HCPCS | Performed by: INTERNAL MEDICINE

## 2025-03-25 PROCEDURE — 3008F BODY MASS INDEX DOCD: CPT | Performed by: INTERNAL MEDICINE

## 2025-03-25 PROCEDURE — 72120 X-RAY BEND ONLY L-S SPINE: CPT

## 2025-03-25 RX ORDER — NEFAZODONE HYDROCHLORIDE 100 MG/1
250 TABLET ORAL DAILY
COMMUNITY

## 2025-03-25 RX ORDER — GABAPENTIN 300 MG/1
300 CAPSULE ORAL 3 TIMES DAILY
Qty: 90 CAPSULE | Refills: 0 | Status: SHIPPED | OUTPATIENT
Start: 2025-03-25

## 2025-03-25 ASSESSMENT — ENCOUNTER SYMPTOMS
VOMITING: 0
MYALGIAS: 1
FEVER: 0
PALPITATIONS: 0
LIGHT-HEADEDNESS: 0
DYSURIA: 0
TROUBLE SWALLOWING: 0
SORE THROAT: 0
FREQUENCY: 0
COUGH: 0
SHORTNESS OF BREATH: 0
DIZZINESS: 0
CONSTIPATION: 0
FATIGUE: 0
NECK PAIN: 1
DIARRHEA: 0
ARTHRALGIAS: 0
JOINT SWELLING: 0
NECK STIFFNESS: 1
BACK PAIN: 1
ABDOMINAL PAIN: 0
NAUSEA: 0

## 2025-03-25 ASSESSMENT — PAIN SCALES - GENERAL: PAINLEVEL_OUTOF10: 4

## 2025-03-25 NOTE — ASSESSMENT & PLAN NOTE
Patient has midline back pain which she has had for multiple years.  She says it always hurts whether she is sitting standing or laying.  She also claims that the pain radiates down her legs intermittently on either the right or left side.  We will start with getting an x-ray of the lumbar spine but I suspect that this is more musculoskeletal in origin    Patient complains of multiple aches and pains and cramps in her hands as well.  I suspect she may have fibromyalgia and that physical therapy would be best suited for.  She reports a positive DIANA test however when we checked it just this past fall it was negative.  At this time because of the hand cramps and other complaints I will check a sed rate CRP and CPK to make sure I am not missing something.  If all of these tests are negative I would consider fibromyalgia the most likely diagnosis I would recommend physical therapy.    After patient had left I received a message from her explaining that her low back pain was indeed a Worker's Compensation case.  There was a miscommunication as I was under the impression from speaking to her in the office that day that there was not a Worker's Comp. case.  Patient has forwarded us information which was added to the chart regarding her Worker's Comp. case.  Had a known this was a workers comp case I would have referred her back to the Worker's Comp. physician and will do so at this time.

## 2025-03-25 NOTE — PROGRESS NOTES
"Subjective   Patient ID: Tiffanie Lozoya is a 29 y.o. female who presents for daily pain and weakness.    Patient is here today because she says \"everything hurts\".  She has had chronic low back pain as long as she can remember.  She had an incident several years ago with a wheelchair that injured her back or neck but she is not specific.  I asked her if there was a Worker's Comp. case and she said no.  She says they are trying to find her a new place to work but every time she tries to do anything her hands cramp up.  Simple things like holding a pencil too long or trying to count change causes her hands to cramp up and then she is unable to continue working.  She also complains of low back pain if she stands sits or even if she is laying flat.  She also complains of sciatica and pain going down each leg in the posterior aspect to about the knee on and off intermittently.  Patient is currently on gabapentin 300 mg 3 times a day and is working with her psychiatrist to get her anxiety and depression under control.          Review of Systems   Constitutional:  Negative for fatigue and fever.   HENT:  Negative for sore throat and trouble swallowing.    Eyes:  Negative for visual disturbance.   Respiratory:  Negative for cough and shortness of breath.    Cardiovascular:  Negative for chest pain, palpitations and leg swelling.   Gastrointestinal:  Negative for abdominal pain, constipation, diarrhea, nausea and vomiting.   Genitourinary:  Negative for dysuria and frequency.   Musculoskeletal:  Positive for back pain, myalgias, neck pain and neck stiffness. Negative for arthralgias, gait problem and joint swelling.        Patient complains of hand cramps if she tries to do anything too long glycol to pen or count change.     Skin:  Negative for rash.   Neurological:  Negative for dizziness and light-headedness.       Objective   Medication Documentation Review Audit       Reviewed by Courtney Boyd MA (Medical " "Assistant) on 03/25/25 at 0943      Medication Order Taking? Sig Documenting Provider Last Dose Status   cholecalciferol (Vitamin D-3) 25 MCG (1000 UT) capsule [id number] Yes Take 1 capsule (25 mcg) by mouth once daily. Historical Provider, MD  Active   clobetasol (Temovate) 0.05 % cream [id number] Yes Apply 1 Application topically 2 times a day. Historical Provider, MD  Active   clobetasol (Temovate) 0.05 % external solution [id number] Yes Apply 1 Application topically 2 times a day. Historical Provider, MD  Active   desonide (DesOwen) 0.05 % cream [id number] Yes Apply 1 Application topically 2 times a day. Historical Provider, MD  Active   gabapentin (Neurontin) 300 mg capsule [id number] Yes Take 1 capsule (300 mg) by mouth 2 times a day.   Patient taking differently: Take 1 capsule (300 mg) by mouth 3 times a day.    Historical Provider, MD  Active   nefazodone (Serzone) 100 mg tablet [id number] Yes Take 1 tablet (100 mg) by mouth 2 times a day.   Patient taking differently: Take 2.5 tablets (250 mg) by mouth once daily at bedtime.    Historical Provider, MD  Active                  Allergies   Allergen Reactions    Depakote [Divalproex] Hives    Adhesive Tape-Silicones Rash    Amoxicillin Rash    Lamictal [Lamotrigine] Rash    Zithromax Z-Wayne [Azithromycin] Rash       /81   Pulse 79   Ht 1.651 m (5' 5\")   Wt 119 kg (261 lb 12.8 oz)   SpO2 96%   BMI 43.57 kg/m²     Physical Exam  Constitutional:       Appearance: Normal appearance. She is obese.   HENT:      Head: Normocephalic and atraumatic.      Nose: Nose normal.   Eyes:      Extraocular Movements: Extraocular movements intact.      Pupils: Pupils are equal, round, and reactive to light.   Cardiovascular:      Rate and Rhythm: Normal rate and regular rhythm.   Pulmonary:      Breath sounds: Normal breath sounds.   Abdominal:      General: Abdomen is flat. Bowel sounds are normal.      Palpations: Abdomen is soft.   Musculoskeletal:      Right lower leg: " No edema.      Left lower leg: No edema.      Comments: Bilateral knee and ankle jerk reflexes are symmetric.  There is no asymmetry or focal deficits in the lower extremities.  There is no tenderness directly over the thoracic or lumbar spine.  Patient has no scoliosis.  There is no pain over the SI joints.      Patient's hands are grossly normal with no erythema no synovitis no edema or swelling and no Heberden's or Rick's nodes.   Neurological:      Mental Status: She is alert.           Assessment/Plan   Problem List Items Addressed This Visit       Chronic midline low back pain without sciatica - Primary     Patient has midline back pain which she has had for multiple years.  She says it always hurts whether she is sitting standing or laying.  She also claims that the pain radiates down her legs intermittently on either the right or left side.  We will start with getting an x-ray of the lumbar spine but I suspect that this is more musculoskeletal in origin    Patient complains of multiple aches and pains and cramps in her hands as well.  I suspect she may have fibromyalgia and that physical therapy would be best suited for.  She reports a positive DIANA test however when we checked it just this past fall it was negative.  At this time because of the hand cramps and other complaints I will check a sed rate CRP and CPK to make sure I am not missing something.  If all of these tests are negative I would consider fibromyalgia the most likely diagnosis I would recommend physical therapy.    After patient had left I received a message from her explaining that her low back pain was indeed a Worker's Compensation case.  There was a miscommunication as I was under the impression from speaking to her in the office that day that there was not a Worker's Comp. case.  Patient has forwarded us information which was added to the chart regarding her Worker's Comp. case.  Had a known this was a workers comp case I would have  referred her back to the Worker's Comp. physician and will do so at this time.         Relevant Orders    Sedimentation Rate    CK    C-reactive protein    XR lumbar spine 4+ views w flexion extension    Hand cramps     Other Visit Diagnoses       Muscle cramps        Relevant Orders    Sedimentation Rate    CK    C-reactive protein    XR lumbar spine 4+ views w flexion extension                   It has been a pleasure seeing you.  Prachi Wells MD

## 2025-03-26 ENCOUNTER — TELEPHONE (OUTPATIENT)
Dept: PRIMARY CARE | Facility: CLINIC | Age: 30
End: 2025-03-26
Payer: MEDICARE

## 2025-03-26 DIAGNOSIS — M79.7 FIBROMYALGIA: Primary | ICD-10-CM

## 2025-03-26 LAB
CRP SERPL-MCNC: 3.8 MG/L
ERYTHROCYTE [SEDIMENTATION RATE] IN BLOOD BY WESTERGREN METHOD: 2 MM/H

## 2025-03-26 NOTE — TELEPHONE ENCOUNTER
----- Message from Prachi Harriman sent at 3/26/2025  3:54 PM EDT -----  Please notify patient all her blood work was totally normal.  There is no indication of any autoimmune disease and/or inflammatory arthropathy.  I believe the patient has fibromyalgia.  The treatment for fibromyalgia is through physical therapy.  I have placed a physical therapy referral for fibromyalgia in the chart.  I can find no limitations for her in the work environment at this time since her workup was negative.

## 2025-03-27 SDOH — HEALTH STABILITY: PHYSICAL HEALTH: ON AVERAGE, HOW MANY DAYS PER WEEK DO YOU ENGAGE IN MODERATE TO STRENUOUS EXERCISE (LIKE A BRISK WALK)?: 0 DAYS

## 2025-03-28 ENCOUNTER — TELEPHONE (OUTPATIENT)
Dept: PRIMARY CARE | Facility: CLINIC | Age: 30
End: 2025-03-28
Payer: MEDICARE

## 2025-03-28 ENCOUNTER — OFFICE VISIT (OUTPATIENT)
Dept: FAMILY MEDICINE CLINIC | Age: 30
End: 2025-03-28
Payer: COMMERCIAL

## 2025-03-28 ENCOUNTER — HOSPITAL ENCOUNTER (OUTPATIENT)
Age: 30
Setting detail: SPECIMEN
Discharge: HOME | End: 2025-03-28
Payer: COMMERCIAL

## 2025-03-28 VITALS
HEART RATE: 71 BPM | SYSTOLIC BLOOD PRESSURE: 128 MMHG | WEIGHT: 258 LBS | HEIGHT: 65 IN | BODY MASS INDEX: 42.99 KG/M2 | TEMPERATURE: 97.2 F | OXYGEN SATURATION: 98 % | DIASTOLIC BLOOD PRESSURE: 82 MMHG

## 2025-03-28 DIAGNOSIS — F41.9 ANXIETY: ICD-10-CM

## 2025-03-28 DIAGNOSIS — R52 GENERALIZED PAIN: ICD-10-CM

## 2025-03-28 DIAGNOSIS — R52 GENERALIZED PAIN: Primary | ICD-10-CM

## 2025-03-28 PROBLEM — F31.81 BIPOLAR 2 DISORDER (HCC): Status: ACTIVE | Noted: 2024-06-08

## 2025-03-28 PROBLEM — F40.10 SOCIAL ANXIETY DISORDER: Status: ACTIVE | Noted: 2024-12-11

## 2025-03-28 PROBLEM — G89.29 CHRONIC MIDLINE LOW BACK PAIN WITHOUT SCIATICA: Status: ACTIVE | Noted: 2025-03-25

## 2025-03-28 PROBLEM — M54.50 CHRONIC MIDLINE LOW BACK PAIN WITHOUT SCIATICA: Status: ACTIVE | Noted: 2025-03-25

## 2025-03-28 PROBLEM — F33.2 SEVERE RECURRENT MAJOR DEPRESSION WITHOUT PSYCHOTIC FEATURES (HCC): Status: ACTIVE | Noted: 2024-06-10

## 2025-03-28 PROCEDURE — G8417 CALC BMI ABV UP PARAM F/U: HCPCS | Performed by: FAMILY MEDICINE

## 2025-03-28 PROCEDURE — G8427 DOCREV CUR MEDS BY ELIG CLIN: HCPCS | Performed by: FAMILY MEDICINE

## 2025-03-28 PROCEDURE — 82607 VITAMIN B-12: CPT

## 2025-03-28 PROCEDURE — 36415 COLL VENOUS BLD VENIPUNCTURE: CPT | Performed by: FAMILY MEDICINE

## 2025-03-28 PROCEDURE — 99204 OFFICE O/P NEW MOD 45 MIN: CPT | Performed by: FAMILY MEDICINE

## 2025-03-28 PROCEDURE — 82306 VITAMIN D 25 HYDROXY: CPT

## 2025-03-28 PROCEDURE — 4004F PT TOBACCO SCREEN RCVD TLK: CPT | Performed by: FAMILY MEDICINE

## 2025-03-28 RX ORDER — CLOBETASOL PROPIONATE 0.5 MG/G
1 CREAM TOPICAL 2 TIMES DAILY
COMMUNITY

## 2025-03-28 RX ORDER — NEFAZODONE HYDROCHLORIDE 250 MG/1
TABLET ORAL
COMMUNITY
Start: 2025-03-20

## 2025-03-28 RX ORDER — DESONIDE 0.5 MG/G
1 CREAM TOPICAL 2 TIMES DAILY
COMMUNITY

## 2025-03-28 RX ORDER — IBUPROFEN 800 MG/1
TABLET, FILM COATED ORAL
COMMUNITY

## 2025-03-28 RX ORDER — GABAPENTIN 300 MG/1
300 CAPSULE ORAL 3 TIMES DAILY
COMMUNITY
Start: 2024-06-12

## 2025-03-28 RX ORDER — CLOBETASOL PROPIONATE 0.5 MG/ML
1 SOLUTION TOPICAL 2 TIMES DAILY
COMMUNITY

## 2025-03-28 SDOH — ECONOMIC STABILITY: FOOD INSECURITY: WITHIN THE PAST 12 MONTHS, THE FOOD YOU BOUGHT JUST DIDN'T LAST AND YOU DIDN'T HAVE MONEY TO GET MORE.: NEVER TRUE

## 2025-03-28 SDOH — ECONOMIC STABILITY: FOOD INSECURITY: WITHIN THE PAST 12 MONTHS, YOU WORRIED THAT YOUR FOOD WOULD RUN OUT BEFORE YOU GOT MONEY TO BUY MORE.: NEVER TRUE

## 2025-03-28 ASSESSMENT — PATIENT HEALTH QUESTIONNAIRE - PHQ9: DEPRESSION UNABLE TO ASSESS: PT REFUSES

## 2025-03-28 NOTE — TELEPHONE ENCOUNTER
----- Message from Prachi Wells sent at 3/27/2025  7:47 AM EDT -----  Please notify patient x-ray of her back was normal and did not show any evidence of arthritis or degenerative changes.  This is consistent with the diagnosis of fibromyalgia or of soft tissue muscle strain.

## 2025-03-28 NOTE — PROGRESS NOTES
[facility]  [address]  [facility] [address]  Dept: [phone]  Dept Fax: [phone]     Chief Complaint:  Chief Complaint   Patient presents with    New Patient     Previous PCP Dr. Maryam Rae    Back Pain     Dx with possible fibromyalgia by Dr. Rae, would like second opinion, last saw previous PCP for this 3/25/25, has telephone encounter from 3/26/25 in which Dr. Rae mentioned possible fibro    Other     States she no longer has a diagnosis of bipolar disorder, was told she may have borderline personality disorder instead       Vitals:    03/28/25 1008 03/28/25 1038   BP: (!) 130/92 128/82   Pulse: 71    Temp: 97.2 °F (36.2 °C)    TempSrc: Infrared    SpO2: 98%    Weight: 117 kg (258 lb)    Height: 1.638 m (5' 4.5\")        HPI:  29 y.o.female who presents for the following:  (Establish)      [facility]: [facility]; currently on gabapentin and nefazodone; lives at home with parents; working as a caregiver with night shift    Chronic generalized pain: long standing since childhood; has limited her work; gets hand cramps and spine/muscle pains; notes a work injury 2017; has had some negative lab investigation; working with psych on her anxiety; recommended PT and her symptoms labeled as fibromyalgia; pt would like a 2nd opinion; she wonders if her legs are different lengths or has a tilted pelvis; looking for job that doesn't require as much use of the hands; interested in vitamin investigation; notes a random sleep schedule (maybe 5-6 hours nightly)    -----------------------------------------------------------------------------    Assessment/Plan:  29 y.o. female here mainly for the following:  Generalized pain  I agree that generalized pain in the setting of uncontrolled anxiety fits with fibromyalgia  We discussed the possibility of further w/u with either rheum or pain clinic; settled on a referral to pain clinic  Also checking Vit D and B12 per

## 2025-03-29 LAB
VITAMIN B-12: 385 PG/ML (ref 232–1245)
VITAMIN D 25-HYDROXY: 48.1 NG/ML (ref 30–100)

## 2025-03-30 ENCOUNTER — RESULTS FOLLOW-UP (OUTPATIENT)
Dept: FAMILY MEDICINE CLINIC | Age: 30
End: 2025-03-30

## 2025-04-14 ENCOUNTER — PATIENT MESSAGE (OUTPATIENT)
Age: 30
End: 2025-04-14

## 2025-04-14 ENCOUNTER — INITIAL CONSULT (OUTPATIENT)
Age: 30
End: 2025-04-14
Payer: COMMERCIAL

## 2025-04-14 VITALS — HEIGHT: 65 IN | BODY MASS INDEX: 42.99 KG/M2 | WEIGHT: 258 LBS | TEMPERATURE: 97 F

## 2025-04-14 DIAGNOSIS — M79.7 FIBROMYALGIA: Primary | ICD-10-CM

## 2025-04-14 PROCEDURE — 99204 OFFICE O/P NEW MOD 45 MIN: CPT | Performed by: STUDENT IN AN ORGANIZED HEALTH CARE EDUCATION/TRAINING PROGRAM

## 2025-04-14 RX ORDER — BACLOFEN 5 MG/1
5 TABLET ORAL 3 TIMES DAILY PRN
Qty: 90 TABLET | Refills: 1 | Status: SHIPPED | OUTPATIENT
Start: 2025-04-14

## 2025-04-14 RX ORDER — NEFAZODONE HYDROCHLORIDE 200 MG/1
TABLET ORAL
COMMUNITY
Start: 2025-04-06

## 2025-04-14 RX ORDER — MELOXICAM 7.5 MG/1
7.5 TABLET ORAL 2 TIMES DAILY PRN
Qty: 60 TABLET | Refills: 3 | Status: SHIPPED | OUTPATIENT
Start: 2025-04-14

## 2025-04-14 ASSESSMENT — ENCOUNTER SYMPTOMS: BACK PAIN: 1

## 2025-04-14 NOTE — PROGRESS NOTES
HPI  Onset: several months  Location: Neck, bilateral arm, bilateral hands, back  Quality: cramping, dull, sharp, and throbbing  Patient states that her hand pain is at a 4 at rest and a 7 with activity on the pain scale.   Patient states that her back pain is at a 3 at rest and a 6 with activity on the pain scale.   The hand pain is present: Intermittently and consistent  The back pain is present: Constant and consistent  The hand pain is exacerbated by: Lifting and writing, using hands  and alleviated by: Heat.  The back pain is exacerbated by: Walking, Bending, Lifting, and Standing and alleviated by: Heat, Lying down, and changing position.  The patient states the pain interferes with her  ADL's : Yes Transferring , Ambulating , and Sleeping  Recent falls: no  Bladder bowel dysfunction:yes - urgency  She is taking the following medications for pain:   NSAIDS: ibuprofen and Neuropathic Agents: gabapentin  Prior treatments tried for chief complaint listed above: n/a  Surgery: no  Physical Therapy: yes: back. No for hands  Chiropractor: no  Acupuncture: no  Massage Therapy: yes   Behavior/Wellness/Psychological support: yes  Expectations of Treatment at the Pain Center: The patient presents today for evaluation with the expectation that they will be able to perform their ADL's without excruciating pain.   Patient denies the following symptoms: Ataxia, saddle anesthesia, nausea, fever, vomiting, or recent antibiotics.

## 2025-04-14 NOTE — PROGRESS NOTES
[facility] [place] [facility], [facility]  [address] [facility] [address]  Dept: [phone]  Dept Fax: [phone]  Loc: [phone]     4/14/2025    Visit type: New patient    Reason for Visit: Hand Pain (bilateral), Knee Pain (bilateral), Back Pain, Neck Pain, and Arm Pain (bilateral)       ASSESSMENT/PLAN   1. Fibromyalgia  Assessment & Plan:     Chronic illness with a severe exacerbation and/or progression which affects ADL's. Pain has been present for 6+ months and is expected to last at least a year. Patient is unable to sleep, transfer, nor ambulate. Goals of care include pain relief >50% and ability to perform ADLs with less pain.    29-year-old female with history of widespread body pain for multiple years with recent exacerbation over the past 6 months.  Pain is extremely debilitating affects ADLs.  Pain score 6 or greater with activity.  Pain limits mobility and functionality.    Upon assessment, patient states that she has tenderness throughout her entire body.  There were spots for pain in her hands and her low back and neck.  All activity exacerbates pain.  Regarding treatments, she is currently on gabapentin with positive effect.  She has done very well with physical therapy in the past and wishes to continue to do so.  She would like to avoid injection therapy is much as possible.    Regarding her past medical history, she follows with psychiatry.  She is on nefazodone.  This limits multiple medications for usage.    From her history, physical exam findings, and lack response to current measure, I believe she will benefit from the following:    - Begin baclofen 5 mg 3 times daily as needed  - Begin meloxicam 7.5 mg twice daily as needed.  Discontinue all other over-the-counter NSAIDs.  - Referral to aquatic physical therapy  - If patient discontinues nefazodone as stated in today's visit, will consider potential duloxetine therapy versus

## 2025-04-14 NOTE — ASSESSMENT & PLAN NOTE
Chronic illness with a severe exacerbation and/or progression which affects ADL's. Pain has been present for 6+ months and is expected to last at least a year. Patient is unable to sleep, transfer, nor ambulate. Goals of care include pain relief >50% and ability to perform ADLs with less pain.    29-year-old female with history of widespread body pain for multiple years with recent exacerbation over the past 6 months.  Pain is extremely debilitating affects ADLs.  Pain score 6 or greater with activity.  Pain limits mobility and functionality.    Upon assessment, patient states that she has tenderness throughout her entire body.  There were spots for pain in her hands and her low back and neck.  All activity exacerbates pain.  Regarding treatments, she is currently on gabapentin with positive effect.  She has done very well with physical therapy in the past and wishes to continue to do so.  She would like to avoid injection therapy is much as possible.    Regarding her past medical history, she follows with psychiatry.  She is on nefazodone.  This limits multiple medications for usage.    From her history, physical exam findings, and lack response to current measure, I believe she will benefit from the following:    - Begin baclofen 5 mg 3 times daily as needed  - Begin meloxicam 7.5 mg twice daily as needed.  Discontinue all other over-the-counter NSAIDs.  - Referral to [facility] physical therapy  - If patient discontinues nefazodone as stated in today's visit, will consider potential duloxetine therapy versus Lyrica therapy.  If we choose to move forward with Lyrica, gabapentin will have to be discontinued.

## 2025-05-23 ENCOUNTER — OFFICE VISIT (OUTPATIENT)
Dept: FAMILY MEDICINE CLINIC | Age: 30
End: 2025-05-23
Payer: COMMERCIAL

## 2025-05-23 VITALS
BODY MASS INDEX: 42.28 KG/M2 | WEIGHT: 253.8 LBS | HEIGHT: 65 IN | SYSTOLIC BLOOD PRESSURE: 132 MMHG | DIASTOLIC BLOOD PRESSURE: 80 MMHG | OXYGEN SATURATION: 99 % | HEART RATE: 65 BPM

## 2025-05-23 DIAGNOSIS — F31.81 BIPOLAR 2 DISORDER (HCC): ICD-10-CM

## 2025-05-23 DIAGNOSIS — E87.5 HYPERKALEMIA: Primary | ICD-10-CM

## 2025-05-23 DIAGNOSIS — R00.2 PALPITATIONS: ICD-10-CM

## 2025-05-23 DIAGNOSIS — F41.9 ANXIETY: ICD-10-CM

## 2025-05-23 LAB
ALBUMIN: 4.5 G/DL
ALP BLD-CCNC: 65 U/L
ALT SERPL-CCNC: 23 U/L
AST SERPL-CCNC: 18 U/L
BILIRUB SERPL-MCNC: 0.4 MG/DL (ref 0.1–1.4)
BUN BLDV-MCNC: 12 MG/DL
CALCIUM SERPL-MCNC: 9.5 MG/DL
CHLORIDE BLD-SCNC: 105 MMOL/L
CO2: 23 MMOL/L
CREAT SERPL-MCNC: 0.9 MG/DL
GFR, ESTIMATED: 80
GLUCOSE FASTING: 91 MG/DL
POTASSIUM SERPL-SCNC: 5.5 MMOL/L
SODIUM BLD-SCNC: 140 MMOL/L
TOTAL PROTEIN: 6.8 G/DL (ref 6.4–8.2)

## 2025-05-23 PROCEDURE — 99214 OFFICE O/P EST MOD 30 MIN: CPT | Performed by: FAMILY MEDICINE

## 2025-05-23 ASSESSMENT — PATIENT HEALTH QUESTIONNAIRE - PHQ9
SUM OF ALL RESPONSES TO PHQ QUESTIONS 1-9: 0
8. MOVING OR SPEAKING SO SLOWLY THAT OTHER PEOPLE COULD HAVE NOTICED. OR THE OPPOSITE, BEING SO FIGETY OR RESTLESS THAT YOU HAVE BEEN MOVING AROUND A LOT MORE THAN USUAL: NOT AT ALL
SUM OF ALL RESPONSES TO PHQ QUESTIONS 1-9: 0
2. FEELING DOWN, DEPRESSED OR HOPELESS: NOT AT ALL
9. THOUGHTS THAT YOU WOULD BE BETTER OFF DEAD, OR OF HURTING YOURSELF: NOT AT ALL
6. FEELING BAD ABOUT YOURSELF - OR THAT YOU ARE A FAILURE OR HAVE LET YOURSELF OR YOUR FAMILY DOWN: NOT AT ALL
10. IF YOU CHECKED OFF ANY PROBLEMS, HOW DIFFICULT HAVE THESE PROBLEMS MADE IT FOR YOU TO DO YOUR WORK, TAKE CARE OF THINGS AT HOME, OR GET ALONG WITH OTHER PEOPLE: NOT DIFFICULT AT ALL
4. FEELING TIRED OR HAVING LITTLE ENERGY: NOT AT ALL
7. TROUBLE CONCENTRATING ON THINGS, SUCH AS READING THE NEWSPAPER OR WATCHING TELEVISION: NOT AT ALL
SUM OF ALL RESPONSES TO PHQ QUESTIONS 1-9: 0
SUM OF ALL RESPONSES TO PHQ QUESTIONS 1-9: 0
1. LITTLE INTEREST OR PLEASURE IN DOING THINGS: NOT AT ALL
3. TROUBLE FALLING OR STAYING ASLEEP: NOT AT ALL
5. POOR APPETITE OR OVEREATING: NOT AT ALL

## 2025-05-23 NOTE — PROGRESS NOTES
occasionally smoked since.   Vaping Use    Vaping status: Every Day    Substances: Nicotine, THC, CBD, Flavoring    Devices: Disposable, Pre-filled or refillable cartridge, Pre-filled pod   Substance and Sexual Activity    Alcohol use: Not Currently     Comment: I used to drink fairly heavily, not anymore with nefazodone.    Drug use: Yes     Types: Marijuana (Weed)     Comment: I am attempting to quit.    Sexual activity: Not Currently     Partners: Male     Comment: hx of Tric   Other Topics Concern    Not on file   Social History Narrative    Not on file     Social Drivers of Health     Financial Resource Strain: Medium Risk (5/12/2022)    Overall Financial Resource Strain (CARDIA)     Difficulty of Paying Living Expenses: Somewhat hard   Food Insecurity: No Food Insecurity (3/28/2025)    Hunger Vital Sign     Worried About Running Out of Food in the Last Year: Never true     Ran Out of Food in the Last Year: Never true   Transportation Needs: No Transportation Needs (3/28/2025)    PRAPARE - Transportation     Lack of Transportation (Medical): No     Lack of Transportation (Non-Medical): No   Physical Activity: Unknown (3/27/2025)    Exercise Vital Sign     Days of Exercise per Week: 0 days     Minutes of Exercise per Session: Not on file   Stress: Stress Concern Present (5/12/2022)    [employer] [place] [employer] Occupational [employer] - Occupational Stress Questionnaire     Feeling of Stress : Very much   Social Connections: Socially Isolated (5/12/2022)    Social Connection and Isolation Panel [NHANES]     Frequency of Communication with Friends and Family: More than three times a week     Frequency of Social Gatherings with Friends and Family: Once a week     Attends [employer] Services: Never     Active Member of Clubs or Organizations: No     Attends Club or Organization Meetings: Never     Marital Status:    Intimate Partner Violence: At Risk (5/12/2022)    Humiliation, Afraid, Rape, and Kick questionnaire

## 2025-06-04 DIAGNOSIS — M79.7 FIBROMYALGIA: ICD-10-CM

## 2025-06-04 RX ORDER — BACLOFEN 5 MG/1
5 TABLET ORAL 3 TIMES DAILY PRN
Qty: 90 TABLET | Refills: 1 | OUTPATIENT
Start: 2025-06-04

## 2025-07-22 ENCOUNTER — OFFICE VISIT (OUTPATIENT)
Age: 30
End: 2025-07-22
Payer: COMMERCIAL

## 2025-07-22 VITALS
HEIGHT: 65 IN | HEART RATE: 93 BPM | OXYGEN SATURATION: 99 % | TEMPERATURE: 97.6 F | SYSTOLIC BLOOD PRESSURE: 142 MMHG | WEIGHT: 253 LBS | DIASTOLIC BLOOD PRESSURE: 94 MMHG | BODY MASS INDEX: 42.15 KG/M2

## 2025-07-22 DIAGNOSIS — M79.7 FIBROMYALGIA: Primary | ICD-10-CM

## 2025-07-22 PROCEDURE — 99214 OFFICE O/P EST MOD 30 MIN: CPT | Performed by: STUDENT IN AN ORGANIZED HEALTH CARE EDUCATION/TRAINING PROGRAM

## 2025-07-22 RX ORDER — RISPERIDONE 0.5 MG/1
0.5 TABLET ORAL DAILY
COMMUNITY
Start: 2025-07-17

## 2025-07-22 RX ORDER — LORAZEPAM 2 MG/1
TABLET ORAL
COMMUNITY
Start: 2025-07-14

## 2025-07-22 RX ORDER — BACLOFEN 5 MG/1
5 TABLET ORAL 3 TIMES DAILY PRN
Qty: 90 TABLET | Refills: 2 | Status: SHIPPED | OUTPATIENT
Start: 2025-07-22

## 2025-07-22 RX ORDER — MELOXICAM 7.5 MG/1
7.5 TABLET ORAL 2 TIMES DAILY PRN
Qty: 60 TABLET | Refills: 3 | Status: SHIPPED | OUTPATIENT
Start: 2025-07-22

## 2025-07-22 ASSESSMENT — ENCOUNTER SYMPTOMS: BACK PAIN: 1

## 2025-07-22 NOTE — PROGRESS NOTES
HPI  Location: Hand Pain (bilateral), Knee Pain (bilateral), Back Pain, Neck Pain, and Arm Pain (bilateral)   Patient states that her pain is at a 3 at rest and a 5 with activity on the pain scale.   Patient is currently prescribed Baclofen (LIORESAL) 5 MG tablet . Patient states she receives some relief from medication.  Patient is currently prescribed meloxicam (MOBIC) 7.5 MG tablet. Patient states she receives substantial relief from medication.  
no distension.   Musculoskeletal:      Right shoulder: Tenderness present.      Left shoulder: Tenderness present.      Right upper arm: Tenderness present.      Left upper arm: Tenderness present.      Right elbow: Tenderness present.      Left elbow: Tenderness present.      Right forearm: Tenderness present.      Left forearm: Tenderness present.      Right wrist: Tenderness present.      Left wrist: Tenderness present.      Right hand: Tenderness present.      Left hand: Tenderness present.      Cervical back: Tenderness present.      Thoracic back: Tenderness present.      Lumbar back: Tenderness present.      Right hip: Tenderness present.      Left hip: Tenderness present.      Right upper leg: Tenderness present.      Left upper leg: Tenderness present.      Right knee: Tenderness present.      Left knee: Tenderness present.      Right lower leg: Tenderness present.      Left lower leg: Tenderness present.      Right ankle: Tenderness present.      Left ankle: Tenderness present.      Right foot: Tenderness present.      Left foot: Tenderness present.   Neurological:      General: No focal deficit present.      Mental Status: She is alert and oriented to person, place, and time.      Cranial Nerves: Cranial nerves 2-12 are intact.      Sensory: Sensation is intact.      Motor: Motor function is intact.      Coordination: Coordination is intact.      Gait: Gait is intact.   Psychiatric:         Mood and Affect: Mood normal.         Behavior: Behavior normal.         Thought Content: Thought content normal.         Judgment: Judgment normal.         Allergies   Allergen Reactions    Depakote [Divalproex Sodium]     Propranolol      Short of breath    Valproic Acid Hives    Adhesive Tape Rash    Amoxicillin Hives and Rash     Other Reaction(s): hives    Azithromycin Hives, Rash and Other (See Comments)    Lamotrigine Hives and Rash       Current Outpatient Medications:     LORazepam (ATIVAN) 2 MG tablet, TAKE 1

## 2025-07-30 ENCOUNTER — PATIENT MESSAGE (OUTPATIENT)
Age: 30
End: 2025-07-30

## 2025-07-30 DIAGNOSIS — M79.7 FIBROMYALGIA: Primary | ICD-10-CM

## 2025-12-12 ENCOUNTER — APPOINTMENT (OUTPATIENT)
Dept: PRIMARY CARE | Facility: CLINIC | Age: 30
End: 2025-12-12
Payer: MEDICARE